# Patient Record
Sex: FEMALE | Race: WHITE | Employment: FULL TIME | URBAN - METROPOLITAN AREA
[De-identification: names, ages, dates, MRNs, and addresses within clinical notes are randomized per-mention and may not be internally consistent; named-entity substitution may affect disease eponyms.]

---

## 2017-06-24 ENCOUNTER — HOSPITAL ENCOUNTER (EMERGENCY)
Age: 22
Discharge: HOME | End: 2017-06-24
Attending: EMERGENCY MEDICINE
Payer: SELF-PAY

## 2017-06-24 VITALS
SYSTOLIC BLOOD PRESSURE: 133 MMHG | WEIGHT: 141.09 LBS | HEART RATE: 108 BPM | DIASTOLIC BLOOD PRESSURE: 80 MMHG | BODY MASS INDEX: 24.09 KG/M2 | HEIGHT: 64 IN | RESPIRATION RATE: 16 BRPM | OXYGEN SATURATION: 100 % | TEMPERATURE: 98.1 F

## 2017-06-24 DIAGNOSIS — S39.93XA VAGINAL TRAUMA, INITIAL ENCOUNTER: ICD-10-CM

## 2017-06-24 DIAGNOSIS — N93.0 PCB (POST COITAL BLEEDING): ICD-10-CM

## 2017-06-24 DIAGNOSIS — N94.89 LABIAL SWELLING: Primary | ICD-10-CM

## 2017-06-24 DIAGNOSIS — N89.8 VAGINAL IRRITATION: ICD-10-CM

## 2017-06-24 LAB — HCG UR QL: NEGATIVE

## 2017-06-24 PROCEDURE — 96375 TX/PRO/DX INJ NEW DRUG ADDON: CPT

## 2017-06-24 PROCEDURE — 96374 THER/PROPH/DIAG INJ IV PUSH: CPT

## 2017-06-24 PROCEDURE — 99284 EMERGENCY DEPT VISIT MOD MDM: CPT

## 2017-06-24 PROCEDURE — 74011636637 HC RX REV CODE- 636/637: Performed by: EMERGENCY MEDICINE

## 2017-06-24 PROCEDURE — 81025 URINE PREGNANCY TEST: CPT

## 2017-06-24 PROCEDURE — 74011250636 HC RX REV CODE- 250/636: Performed by: EMERGENCY MEDICINE

## 2017-06-24 RX ORDER — METHYLPREDNISOLONE 4 MG/1
TABLET ORAL
Qty: 1 DOSE PACK | Refills: 0 | Status: SHIPPED | OUTPATIENT
Start: 2017-06-24

## 2017-06-24 RX ORDER — IBUPROFEN 600 MG/1
600 TABLET ORAL
Qty: 30 TAB | Refills: 0 | Status: SHIPPED | OUTPATIENT
Start: 2017-06-24

## 2017-06-24 RX ORDER — MORPHINE SULFATE 2 MG/ML
4 INJECTION, SOLUTION INTRAMUSCULAR; INTRAVENOUS
Status: COMPLETED | OUTPATIENT
Start: 2017-06-24 | End: 2017-06-24

## 2017-06-24 RX ORDER — DIPHENHYDRAMINE HCL 25 MG
25 CAPSULE ORAL
Qty: 30 CAP | Refills: 0 | Status: SHIPPED | OUTPATIENT
Start: 2017-06-24

## 2017-06-24 RX ORDER — DIPHENHYDRAMINE HYDROCHLORIDE 50 MG/ML
25 INJECTION, SOLUTION INTRAMUSCULAR; INTRAVENOUS
Status: COMPLETED | OUTPATIENT
Start: 2017-06-24 | End: 2017-06-24

## 2017-06-24 RX ORDER — PREDNISONE 20 MG/1
60 TABLET ORAL
Status: COMPLETED | OUTPATIENT
Start: 2017-06-24 | End: 2017-06-24

## 2017-06-24 RX ADMIN — DIPHENHYDRAMINE HYDROCHLORIDE 25 MG: 50 INJECTION, SOLUTION INTRAMUSCULAR; INTRAVENOUS at 02:08

## 2017-06-24 RX ADMIN — PREDNISONE 60 MG: 20 TABLET ORAL at 03:22

## 2017-06-24 RX ADMIN — Medication 4 MG: at 02:08

## 2017-06-24 NOTE — ED NOTES
Discharge instructions reviewed with pt and copy given along with RX by Dr Jairon Peterson. All questions answered at this time. Pt discharged ambulatory home.

## 2017-06-24 NOTE — ED PROVIDER NOTES
HPI Comments: Anel Mendenhall, 25 y.o. Female with no PMHx  presents ambulatory to the ED with cc of condom stuck in vagina PTA. Pt states that she was having intercourse when she noticed the condom became stuck. She tried to remove it at home but was unable to do so, noting swelling to labia and vaginal bleeding. She denies violent intercourse, noting the bleeding and swelling is from her attempts to remove the condom. She has not taken any pain medications. She is not on birth control and reports her LMP was 6/3. She denies any fevers, chills, N/V/D, CP or SOB. PCP: PROVIDER UNKNOWN    Social history significant for: + Tobacco, + EtOH, - Illicit Drug Use    There are no other complaints, changes, or physical findings at this time. Written by Catherne Simmonds, ED Scribe, as dictated by Musa Carey MD.      The history is provided by the patient. No  was used. History reviewed. No pertinent past medical history. History reviewed. No pertinent surgical history. History reviewed. No pertinent family history. Social History     Social History    Marital status: SINGLE     Spouse name: N/A    Number of children: N/A    Years of education: N/A     Occupational History    Not on file. Social History Main Topics    Smoking status: Light Tobacco Smoker    Smokeless tobacco: Never Used      Comment: occasional    Alcohol use Yes      Comment: once a week    Drug use: No    Sexual activity: Yes     Birth control/ protection: Condom     Other Topics Concern    Not on file     Social History Narrative    No narrative on file         ALLERGIES: Codeine and Peanut    Review of Systems   Constitutional: Negative for activity change, appetite change, fatigue and fever. HENT: Negative. Negative for congestion, rhinorrhea and sore throat. Respiratory: Negative. Negative for cough, shortness of breath and wheezing. Cardiovascular: Negative.   Negative for chest pain and leg swelling. Gastrointestinal: Negative. Negative for abdominal distention, abdominal pain, constipation, diarrhea, nausea and vomiting. Endocrine: Negative. Genitourinary: Positive for vaginal bleeding. Negative for difficulty urinating, dysuria, menstrual problem and vaginal discharge.        (+) condom stuck in vagina  (+) Labia swelling   Musculoskeletal: Negative. Negative for arthralgias, joint swelling and myalgias. Skin: Negative. Negative for rash. Neurological: Negative. Negative for dizziness, weakness, light-headedness and headaches. Psychiatric/Behavioral: Negative. Patient Vitals for the past 12 hrs:   Temp Pulse Resp BP SpO2   06/24/17 0116 98.1 °F (36.7 °C) (!) 108 16 133/80 100 %       Physical Exam   Constitutional: She is oriented to person, place, and time. She appears well-developed and well-nourished. HENT:   Head: Atraumatic. Eyes: EOM are normal.   Cardiovascular: Normal rate, regular rhythm, normal heart sounds and intact distal pulses. Exam reveals no gallop and no friction rub. No murmur heard. Pulmonary/Chest: Effort normal and breath sounds normal. No respiratory distress. She has no wheezes. She has no rales. She exhibits no tenderness. Abdominal: Soft. Bowel sounds are normal. She exhibits no distension and no mass. There is no tenderness. There is no rebound and no guarding. Genitourinary:   Genitourinary Comments: Severe swelling of both labia  Vaginal wall swelling with irritation of vaginal wall with small amount of bleeding. Difficult to assess due to discomfort. Will medicate and reassess    On reassessment, able to fully visualize vaginal canal, cul-de sac, and cervix. No foreign body identified. Vagina inspected with Dr Neno Khoury at the same time who agreed to no foreign body appreciated  Blood cleared with one sculpette  Swelling of labia/introitus still present   Musculoskeletal: Normal range of motion. She exhibits no edema or tenderness. Neurological: She is oriented to person, place, and time. Skin: Skin is warm. Psychiatric: She has a normal mood and affect. Nursing note and vitals reviewed. MDM  Number of Diagnoses or Management Options  Labial swelling:   PCB (post coital bleeding):   Vaginal irritation:   Vaginal trauma, initial encounter:   Diagnosis management comments: Foreign body vs allergic reaction/contact dermatitis-- pt denies aggressive intercourse as causative agent. She feels intercourse was normal for them and she has never had this happen before. The type of Trojan condom however, was new and never used before. So, contact dermatitis/allergic reaction must be considered. Will medicate for pain and swelling and reassess. Amount and/or Complexity of Data Reviewed  Clinical lab tests: ordered and reviewed  Review and summarize past medical records: yes    Patient Progress  Patient progress: stable    ED Course       Procedures      Procedure Note - Pelvic Exam:    1:47 AM  Performed by: Julius Doshi MD  Chaperoned by: Lisa Landin RN  Pelvic exam was performed using bimanual and speculum. Further findings noted in physical exam.   The procedure took 1-15 minutes, and pt tolerated moderately. Written by Riya Chen ED Scribrudy, as dictated by Julius Doshi MD.    3:41 AM   Pt unable to verify what medications she can take with codeine allergy. Pt is comfortable with plan which includes Medrol dose pack, benadryl and ibuprofen. She agrees to not have intercourse or anything inserted into her vagina until re-evaluated by OB GYN. 3:45AM  Pts pain significantly improved at  time of d/c. Swelling persists. Small amount of vaginal bleeding. Pad provided.       MEDICATIONS GIVEN:  Medications   morphine injection 4 mg (4 mg IntraVENous Given 6/24/17 0208)   diphenhydrAMINE (BENADRYL) injection 25 mg (25 mg IntraVENous Given 6/24/17 0208)   predniSONE (DELTASONE) tablet 60 mg (60 mg Oral Given 6/24/17 0322) IMPRESSION:  1. Labial swelling    2. Vaginal irritation    3. Vaginal trauma, initial encounter    4. PCB (post coital bleeding)        PLAN:  1. Current Discharge Medication List      START taking these medications    Details   methylPREDNISolone (MEDROL, KRISHNA,) 4 mg tablet Take as directed. Qty: 1 Dose Pack, Refills: 0      ibuprofen (MOTRIN) 600 mg tablet Take 1 Tab by mouth every eight (8) hours as needed for Pain. Qty: 30 Tab, Refills: 0      diphenhydrAMINE (BENADRYL) 25 mg capsule Take 1 Cap by mouth every six (6) hours as needed. For ongoing swelling  Qty: 30 Cap, Refills: 0           2. Follow-up Information     Follow up With Details Comments [id number] In 2 days for re-evaluation [address] [place] [address]  [phone]    [facility] EMERGENCY DEPT  If symptoms worsen [address] [place] [address]  [phone]        Return to ED if worse       Discharge Note:  3:58 AM  The pt is ready for discharge. The pt's signs, symptoms, diagnosis, and discharge instructions have been discussed and pt has conveyed their understanding. The pt is to follow up as recommended or return to ER should their symptoms worsen. Plan has been discussed and pt is in agreement. This note is prepared by Lisandro Arita, acting as a Scribe for Taylor Barlow MD.    Taylor Barlow MD: The scribe's documentation has been prepared under my direction and personally reviewed by me in its entirety. I confirm that the notes above accurately reflects all work, treatment, procedures, and medical decision making performed by me.

## 2017-06-24 NOTE — ED TRIAGE NOTES
Pt arrived ambulatory to ED with c/o condom stuck in vagina. Pt unable to remove and is now bleeding and swollen from trying to remove it.